# Patient Record
Sex: MALE | Race: WHITE | NOT HISPANIC OR LATINO | URBAN - METROPOLITAN AREA
[De-identification: names, ages, dates, MRNs, and addresses within clinical notes are randomized per-mention and may not be internally consistent; named-entity substitution may affect disease eponyms.]

---

## 2024-09-03 RX ORDER — SILVER SULFADIAZINE 10 MG/G
CREAM TOPICAL DAILY
COMMUNITY
Start: 2024-08-08 | End: 2025-08-08

## 2024-09-03 RX ORDER — HYDROXYCHLOROQUINE SULFATE 200 MG/1
400 TABLET, FILM COATED ORAL DAILY
COMMUNITY
Start: 2024-08-08

## 2024-09-03 RX ORDER — FOLIC ACID 0.8 MG
400 TABLET ORAL DAILY
COMMUNITY

## 2024-09-03 RX ORDER — ATORVASTATIN CALCIUM 20 MG/1
20 TABLET, FILM COATED ORAL DAILY
COMMUNITY
Start: 2023-10-25

## 2024-09-03 RX ORDER — PRIMIDONE 50 MG/1
50 TABLET ORAL 4 TIMES DAILY
COMMUNITY

## 2024-09-03 RX ORDER — LEFLUNOMIDE 10 MG/1
10 TABLET ORAL DAILY
COMMUNITY
Start: 2024-08-30

## 2024-09-03 RX ORDER — TRIAMCINOLONE ACETONIDE 1 MG/G
OINTMENT TOPICAL 2 TIMES DAILY
COMMUNITY
Start: 2024-08-29 | End: 2025-08-29

## 2024-09-20 RX ORDER — IPRATROPIUM BROMIDE AND ALBUTEROL 20; 100 UG/1; UG/1
SPRAY, METERED RESPIRATORY (INHALATION)
COMMUNITY
Start: 2024-07-25

## 2024-09-23 ENCOUNTER — OFFICE VISIT (OUTPATIENT)
Dept: PODIATRY | Age: 69
End: 2024-09-23
Payer: COMMERCIAL

## 2024-09-23 VITALS
SYSTOLIC BLOOD PRESSURE: 122 MMHG | HEART RATE: 85 BPM | DIASTOLIC BLOOD PRESSURE: 72 MMHG | OXYGEN SATURATION: 96 % | WEIGHT: 190.4 LBS | HEIGHT: 72 IN | BODY MASS INDEX: 25.79 KG/M2 | TEMPERATURE: 98.9 F

## 2024-09-23 DIAGNOSIS — E11.42 CONTROLLED TYPE 2 DIABETES MELLITUS WITH DIABETIC POLYNEUROPATHY, UNSPECIFIED WHETHER LONG TERM INSULIN USE (HCC): Primary | ICD-10-CM

## 2024-09-23 DIAGNOSIS — B35.1 ONYCHOMYCOSIS: ICD-10-CM

## 2024-09-23 PROCEDURE — 11721 DEBRIDE NAIL 6 OR MORE: CPT | Performed by: STUDENT IN AN ORGANIZED HEALTH CARE EDUCATION/TRAINING PROGRAM

## 2024-09-23 PROCEDURE — 99203 OFFICE O/P NEW LOW 30 MIN: CPT | Performed by: STUDENT IN AN ORGANIZED HEALTH CARE EDUCATION/TRAINING PROGRAM

## 2024-09-23 NOTE — PROGRESS NOTES
"Ambulatory Visit  Name: Florencio Pool      : 1955      MRN: [id number]  Encounter Provider: Jaye Saenz DPM  Encounter Date: 2024   Encounter department: [facility] [place]    Assessment & Plan  Controlled type 2 diabetes mellitus with diabetic polyneuropathy, unspecified whether long term insulin use (HCC)    No results found for: \"HGBA1C\"         Onychomycosis  Q9            PLAN:  I reviewed clinical exam with patient in detail today. I have discussed with the patient the pathophysiology of this diagnosis and reviewed how the examination correlates with this diagnosis.  DFE as below. Patient has significant lower extremity risk due to neuropathy, parasthesia, edema, and trophic skin changes to the lower extremity.  Debridement of toenails. Using nail nipper, lisa, and curette, nails were sharply debrided, reduced in thickness and length. Devitalized nail tissue and fungal debris excised and removed. Patient tolerated well.    F/u [facility] for LE wounds  Discussed proper shoe gear, daily inspections of feet, and general foot health with patient.   Patient has Q9  findings and is recommended for at risk foot care every 9-10 weeks.      History of Present Illness     Florencio Pool is a 69 y.o. male who presents for DFE and nail care. See's [facility]CC for Venous ulcerations to LE with legs wrapped. + diabetic. Notes some burning/tingling to left heel.       Review of Systems   Constitutional:  Negative for activity change, chills and fever.   HENT: Negative.     Respiratory:  Negative for cough, chest tightness and shortness of breath.    Cardiovascular:  Positive for leg swelling. Negative for chest pain.   Endocrine: Negative.    Genitourinary: Negative.    Neurological: Negative.  Negative for numbness.   Psychiatric/Behavioral: Negative.  Negative for agitation and behavioral problems.            Objective     /72   Pulse 85   Temp 98.9 °F (37.2 °C) (Temporal)   Ht 6' " (1.829 m)   Wt 86.4 kg (190 lb 6.4 oz)   SpO2 96%   BMI 25.82 kg/m²     Physical Exam  Constitutional:       General: He is not in acute distress.     Appearance: Normal appearance. He is not ill-appearing.   Cardiovascular:      Pulses: Pulses are weak.           Dorsalis pedis pulses are 1+ on the right side and 1+ on the left side.        Posterior tibial pulses are 0 on the right side and 0 on the left side.      Comments: Legs to toes warm to cool. Diminished pedal hair. B/L LE mild edema with varicosities.   Pulmonary:      Effort: Pulmonary effort is normal.   Musculoskeletal:         General: No tenderness. Normal range of motion.   Feet:      Right foot:      Skin integrity: Dry skin present. No ulcer, skin breakdown, erythema, warmth or callus.      Left foot:      Skin integrity: Dry skin present. No ulcer, skin breakdown, erythema, warmth or callus.   Skin:     General: Skin is warm.      Capillary Refill: Capillary refill takes less than 2 seconds.      Comments: Atrophic skin to LE - thin, dry and shiny.   Dressings intact to B/L LE. Underlying wounds not assessed.   Elongated, thickened, yellowed toenails x10 with subungual debris.   Neurological:      General: No focal deficit present.      Mental Status: He is alert and oriented to person, place, and time.   Psychiatric:         Mood and Affect: Mood normal.         Diabetic Foot Exam    Patient's shoes and socks removed.    Right Foot/Ankle   Right Foot Inspection  Skin Exam: skin normal, skin intact and dry skin. No warmth, no callus, no erythema, no maceration, no abnormal color, no pre-ulcer, no ulcer and no callus.     Toe Exam: right toe deformity.     Sensory   Vibration: absent  Proprioception: absent  Monofilament testing: diminished    Vascular  Capillary refills: < 3 seconds  The right DP pulse is 1+. The right PT pulse is 0.     Left Foot/Ankle  Left Foot Inspection  Skin Exam: skin normal, skin intact and dry skin. No warmth, no  erythema, no maceration, normal color, no pre-ulcer, no ulcer and no callus.     Toe Exam: left toe deformity.     Sensory   Vibration: absent  Proprioception: absent  Monofilament testing: diminished    Vascular  Capillary refills: < 3 seconds  The left DP pulse is 1+. The left PT pulse is 0.     Assign Risk Category  Deformity present  Loss of protective sensation  Weak pulses  Risk: 2

## 2025-01-26 ENCOUNTER — APPOINTMENT (OUTPATIENT)
Dept: RADIOLOGY | Facility: CLINIC | Age: 70
End: 2025-01-26
Payer: COMMERCIAL

## 2025-01-26 ENCOUNTER — OFFICE VISIT (OUTPATIENT)
Dept: URGENT CARE | Facility: CLINIC | Age: 70
End: 2025-01-26
Payer: COMMERCIAL

## 2025-01-26 VITALS
DIASTOLIC BLOOD PRESSURE: 66 MMHG | HEART RATE: 90 BPM | OXYGEN SATURATION: 98 % | HEIGHT: 72 IN | RESPIRATION RATE: 16 BRPM | TEMPERATURE: 96.6 F | WEIGHT: 185 LBS | BODY MASS INDEX: 25.06 KG/M2 | SYSTOLIC BLOOD PRESSURE: 124 MMHG

## 2025-01-26 DIAGNOSIS — S81.002A OPEN WOUND OF LEFT KNEE, INITIAL ENCOUNTER: Primary | ICD-10-CM

## 2025-01-26 PROCEDURE — 73564 X-RAY EXAM KNEE 4 OR MORE: CPT

## 2025-01-26 PROCEDURE — 99213 OFFICE O/P EST LOW 20 MIN: CPT

## 2025-01-26 RX ORDER — CEPHALEXIN 500 MG/1
500 CAPSULE ORAL EVERY 8 HOURS SCHEDULED
Qty: 21 CAPSULE | Refills: 0 | Status: SHIPPED | OUTPATIENT
Start: 2025-01-26 | End: 2025-02-02

## 2025-01-26 NOTE — PROGRESS NOTES
[facility]        NAME: Florencio Pool is a 70 y.o. male  : 1955    MRN: [id number]  DATE: 2025  TIME: 3:45 PM    Assessment and Plan   Open wound of left knee, initial encounter [S81.002A]  1. Open wound of left knee, initial encounter  XR knee 4+ vw left injury    cephalexin (KEFLEX) 500 mg capsule    Ambulatory Referral to Wound Care    CANCELED: XR knee 3 vw left non injury        Discussed problem with patient.  X-rays reveal no acute abnormalities but awaiting official radiology interpretation.  Will prescribe Keflex for antibiotic prophylaxis and discussed good wound care.  Wound was addressed using nonadherent, ABG, Coban.  Placing referral for wound care for follow-up as patient has been with Dr. Bloom in the past Tdap utd    Patient Instructions       Follow up with PCP in 3-5 days.  Proceed to  ER if symptoms worsen.    If tests are performed, our office will contact you with results only if changes need to made to the care plan discussed with you at the visit. [facility]    Chief Complaint     Chief Complaint   Patient presents with   • Knee Injury     Bumped left knee getting into car X 3 days ago , then x 1 day ago bumped area oozing drainage and blood and starting to hurt         History of Present Illness       70-year-old male with a past medical history of of stasis dermatitis, diabetes presents with a left knee wound that occurred 3 days ago.  Patient states he bumped his knee getting into his car and suffered a puncture wound to it.  Has not tried to do conservative management by applying antibiotic ointment and keeping it clean and dry but started to burn and hurt 1 day ago.      Review of Systems   Review of Systems   Constitutional:  Negative for appetite change, chills, fatigue and fever.   Respiratory:  Negative for cough, shortness of breath, wheezing and stridor.    Cardiovascular:  Negative for chest pain and  palpitations.   Skin:  Positive for wound.         Current Medications       Current Outpatient Medications:   •  atorvastatin (LIPITOR) 20 mg tablet, Take 20 mg by mouth daily, Disp: , Rfl:   •  cephalexin (KEFLEX) 500 mg capsule, Take 1 capsule (500 mg total) by mouth every 8 (eight) hours for 7 days, Disp: 21 capsule, Rfl: 0  •  Combivent Respimat inhaler, INHALE 1 PUFF BY MOUTH 4 TIMES A DAY AS NEEDED, Disp: , Rfl:   •  folic acid (FOLVITE) 800 MCG tablet, Take 400 mcg by mouth daily, Disp: , Rfl:   •  hydroxychloroquine (PLAQUENIL) 200 mg tablet, Take 400 mg by mouth daily, Disp: , Rfl:   •  leflunomide (ARAVA) 10 MG tablet, Take 10 mg by mouth daily, Disp: , Rfl:   •  primidone (MYSOLINE) 50 mg tablet, Take 50 mg by mouth 4 (four) times a day, Disp: , Rfl:   •  sitaGLIPtin (Januvia) 100 mg tablet, Take 100 mg by mouth daily, Disp: , Rfl:   •  Aspirin 81 MG CAPS, Take 81 mg by mouth (Patient not taking: Reported on 1/26/2025), Disp: , Rfl:   •  Coenzyme Q10-Fish Oil-Vit E (CO-Q 10 Omega-3 Fish Oil) CAPS, Take by mouth (Patient not taking: Reported on 1/26/2025), Disp: , Rfl:   •  Enbrel SureClick 50 MG/ML injection, , Disp: , Rfl:   •  silver sulfadiazine (SILVADENE,SSD) 1 % cream, Apply topically daily (Patient not taking: Reported on 1/26/2025), Disp: , Rfl:   •  triamcinolone (KENALOG) 0.1 % ointment, Apply topically 2 (two) times a day (Patient not taking: Reported on 1/26/2025), Disp: , Rfl:     Current Allergies     Allergies as of 01/26/2025 - Reviewed 01/26/2025   Allergen Reaction Noted   • Sulfa antibiotics Rash 08/08/2024            The following portions of the patient's history were reviewed and updated as appropriate: allergies, current medications, past family history, past medical history, past social history, past surgical history and problem list.     Past Medical History:   Diagnosis Date   • Diabetes mellitus (HCC)    • Rheumatic arteritis        Past Surgical History:   Procedure Laterality  Date   • NO PAST SURGERIES         History reviewed. No pertinent family history.      Medications have been verified.        Objective   /66   Pulse 90   Temp (!) 96.6 °F (35.9 °C)   Resp 16   Ht 6' (1.829 m)   Wt 83.9 kg (185 lb)   SpO2 98%   BMI 25.09 kg/m²        Physical Exam     Physical Exam  Vitals and nursing note reviewed.   Constitutional:       General: He is not in acute distress.     Appearance: Normal appearance. He is normal weight. He is not ill-appearing, toxic-appearing or diaphoretic.   Cardiovascular:      Rate and Rhythm: Normal rate and regular rhythm.      Pulses: Normal pulses.      Heart sounds: Normal heart sounds. No murmur heard.     No friction rub. No gallop.   Pulmonary:      Effort: Pulmonary effort is normal. No respiratory distress.      Breath sounds: Normal breath sounds. No stridor. No wheezing, rhonchi or rales.   Chest:      Chest wall: No tenderness.   Musculoskeletal:      Comments: Puncture wound abrasion to patient's left knee.  Minor surrounding erythema with serosanguineous discharge.   Neurological:      Mental Status: He is alert.

## 2025-01-27 ENCOUNTER — TELEPHONE (OUTPATIENT)
Age: 70
End: 2025-01-27

## 2025-01-29 ENCOUNTER — OFFICE VISIT (OUTPATIENT)
Facility: CLINIC | Age: 70
End: 2025-01-29
Payer: COMMERCIAL

## 2025-01-29 VITALS
RESPIRATION RATE: 18 BRPM | TEMPERATURE: 98 F | SYSTOLIC BLOOD PRESSURE: 118 MMHG | HEART RATE: 77 BPM | BODY MASS INDEX: 25.06 KG/M2 | DIASTOLIC BLOOD PRESSURE: 66 MMHG | HEIGHT: 72 IN | WEIGHT: 185 LBS

## 2025-01-29 DIAGNOSIS — Z72.0 TOBACCO USE: ICD-10-CM

## 2025-01-29 DIAGNOSIS — S81.002A OPEN WOUND OF LEFT KNEE, INITIAL ENCOUNTER: Primary | ICD-10-CM

## 2025-01-29 DIAGNOSIS — E11.69 TYPE 2 DIABETES MELLITUS WITH OTHER SPECIFIED COMPLICATION, UNSPECIFIED WHETHER LONG TERM INSULIN USE (HCC): ICD-10-CM

## 2025-01-29 PROCEDURE — 99204 OFFICE O/P NEW MOD 45 MIN: CPT | Performed by: FAMILY MEDICINE

## 2025-01-29 PROCEDURE — 99213 OFFICE O/P EST LOW 20 MIN: CPT | Performed by: FAMILY MEDICINE

## 2025-01-29 RX ORDER — LIDOCAINE 40 MG/G
CREAM TOPICAL ONCE
Status: COMPLETED | OUTPATIENT
Start: 2025-01-29 | End: 2025-01-29

## 2025-01-29 RX ADMIN — LIDOCAINE 1 APPLICATION: 40 CREAM TOPICAL at 15:22

## 2025-01-29 NOTE — PROGRESS NOTES
Wound Procedure Treatment Traumatic Anterior;Left Knee    Performed by: Laura Verdugo RN  Authorized by: Kelli Diane MD    Associated wounds:   Wound 01/29/25 Traumatic Knee Anterior;Left  Wound cleansed with:  Wound cleanser  Applied primary dressing:  Acticoat and Packing  Applied secondary dressing:  ABD  Dressing secured with:  Ada and Tape  Comments:    Acticoat 7 packed to wound base     ACE bandage

## 2025-01-29 NOTE — PROGRESS NOTES
Patient ID: Florencio Pool is a 70 y.o. male Date of Birth 1955       Chief Complaint   Patient presents with   • New Patient Visit       Allergies:  Sulfa antibiotics    Diagnosis:      Diagnosis ICD-10-CM Associated Orders   1. Open wound of left knee, initial encounter  S81.002A Wound cleansing and dressings Traumatic Anterior;Left Knee     lidocaine (LMX) 4 % cream     EXTERNAL Referral to Home Health     Wound Procedure Treatment Traumatic Anterior;Left Knee      2. Tobacco use  Z72.0       3. Type 2 diabetes mellitus with other specified complication, unspecified whether long term insulin use (HCC)  E11.69                 Assessment & Plan:  Open traumatic wound left knee: Small wound anterior knee with granular appearing wound bed.  However on exam there is deep probe about 0.7 cm and is very close to bone though no bone is clearly exposed.  Serosanguineous drainage is present.  Periwound is pink without increased warmth, lymphangitic streaking.  No purulence or malodor.  Do not appreciate any surrounding fluid collection or abscess.  Cleanse with prophase  Recommend thin strip of Acticoat 3 to pack wound - gently placing, do not force.  Discussed that should this area fill-in and packing be unable to be placed in between this visit and next week, can apply on top of wound.  Change dressing 3 times per week.  Patient's recent urgent care visit and x-ray reviewed.  X-ray detailed below.  Recommend patient complete antibiotic as prescribed by urgent care  Avoid any excessive ambulation or bending of the knee.  Recommend elevation of legs when resting above level of heart  T2DM:  A1C results reviewed with the patient today. though no recent A1c is available in our system patient believes it is around 8.  Discussed importance of diabetic control especially in setting of open wounds.  He expressed understanding and is monitoring this with his PCP  Smoking: Patient counseled on smoking cessation.  He reports he  "is not ready to quit at this time.   Reviewed extreme importance of very close monitoring of wound. Discussed that diabetes places patients at increased risk for wound complications despite adequate cleansing and care.  Should patient experience any acute change or any of the following symptoms including but not limited to fever, chills, increased pain at the wound, swelling, purulent drainage, foul odor, etc... to immediately proceed to emergency department. Pt expresses understanding.   Follow-up in 1 week or sooner if needed    X-ray left knee 1/26/2025:  \"IMPRESSION:  1. No acute osseous abnormality.  2. Moderate prepatellar soft tissue swelling without radiopaque foreign body or soft tissue gas.\"    Portions of the record may have been created with voice recognition software. Occasional wrong word or \"sound alike\" substitutions may have occurred due to the inherent limitations of voice recognition software. Read the chart carefully and recognize, using context, where substitutions have occurred.    Subjective:   Mr. Pool is a 70-year-old male with a past medical history including but not limited to stasis dermatitis, rheumatoid arthritis, tobacco use, and diabetes who presents to the wound center today for evaluation of left knee wound that occurred around 1/23/2025 when he bumped his leg on a part in his car.  Denies any foreign body within wound.  Per patient and chart review went to urgent care on 1/26/2025 because he was concerned for infection.  At urgent care, x-ray completed which did not show any acute abnormalities.  He was prescribed Keflex and instructed to utilize nonadherent gauze ABD and Coban and.  Referred to wound care by urgent care.  Patient has followed with a wound care center in the past for stasis dermatitis and venous ulcerations.   He reports he has not had fever or chills.  See below for full ROS      The following portions of the patient's history were reviewed and updated as " appropriate:   Patient Active Problem List   Diagnosis   • Type 2 diabetes mellitus with other specified complication (HCC)     Past Medical History:   Diagnosis Date   • Diabetes mellitus (HCC)    • Rheumatic arteritis      Past Surgical History:   Procedure Laterality Date   • NO PAST SURGERIES       No family history on file.   Social History     Socioeconomic History   • Marital status: Single     Spouse name: Not on file   • Number of children: Not on file   • Years of education: Not on file   • Highest education level: Not on file   Occupational History   • Not on file   Tobacco Use   • Smoking status: Some Days     Current packs/day: 0.50     Types: Cigarettes   • Smokeless tobacco: Never   Vaping Use   • Vaping status: Never Used   Substance and Sexual Activity   • Alcohol use: Not Currently   • Drug use: Not Currently   • Sexual activity: Not on file   Other Topics Concern   • Not on file   Social History Narrative   • Not on file     Social Drivers of Health     Financial Resource Strain: Medium Risk (6/20/2022)    Received from [facility]    Overall Financial Resource Strain (CARDIA)    • Difficulty of Paying Living Expenses: Somewhat hard   Food Insecurity: No Food Insecurity (6/20/2022)    Received from [facility]    Hunger Vital Sign    • Worried About Running Out of Food in the Last Year: Never true    • Ran Out of Food in the Last Year: Never true   Transportation Needs: No Transportation Needs (6/20/2022)    Received from [facility]    PRAPARE - Transportation    • Lack of Transportation (Medical): No    • Lack of Transportation (Non-Medical): No   Physical Activity: Insufficiently Active (6/20/2022)    Received from [facility]    Exercise Vital Sign    • Days of Exercise per Week: 3 days    • Minutes of Exercise per Session: 30 min   Stress: Stress Concern Present (6/20/2022)    Received from [facility]    [employer]  [employer] - Occupational Stress Questionnaire    • Feeling of Stress : Very much   Social Connections: Moderately Isolated (6/20/2022)    Received from [facility]    Social Connection and Isolation Panel [NHANES]    • Frequency of Communication with Friends and Family: More than three times a week    • Frequency of Social Gatherings with Friends and Family: Once a week    • Attends Church Services: 1 to 4 times per year    • Active Member of Clubs or Organizations: No    • Attends Club or Organization Meetings: Never    • Marital Status:    Intimate Partner Violence: Not At Risk (6/20/2022)    Received from [facility]    Intimate Partner Violence    • Within the last year, have you been afraid of your partner, ex-partner or family member?: 2    • Within the last year, have you been humiliated or emotionally abused in other ways by your partner, ex-partner or family member?: 2    • Within the last year, have you been kicked, hit, slapped, or otherwise physically hurt by your partner, ex-partner or family member?: 2    • Within the last year, have you been raped or forced to have any kind of sexual activity by your partner, ex-partner or family member?: 2   Housing Stability: Not on file        Current Outpatient Medications:   •  Aspirin 81 MG CAPS, Take 81 mg by mouth (Patient not taking: Reported on 1/26/2025), Disp: , Rfl:   •  atorvastatin (LIPITOR) 20 mg tablet, Take 20 mg by mouth daily, Disp: , Rfl:   •  cephalexin (KEFLEX) 500 mg capsule, Take 1 capsule (500 mg total) by mouth every 8 (eight) hours for 7 days, Disp: 21 capsule, Rfl: 0  •  Coenzyme Q10-Fish Oil-Vit E (CO-Q 10 Omega-3 Fish Oil) CAPS, Take by mouth (Patient not taking: Reported on 1/26/2025), Disp: , Rfl:   •  Combivent Respimat inhaler, INHALE 1 PUFF BY MOUTH 4 TIMES A DAY AS NEEDED, Disp: , Rfl:   •  Enbrel SureClick 50 MG/ML injection, , Disp: , Rfl:   •  folic acid (FOLVITE) 800 MCG tablet,  Take 400 mcg by mouth daily, Disp: , Rfl:   •  hydroxychloroquine (PLAQUENIL) 200 mg tablet, Take 400 mg by mouth daily, Disp: , Rfl:   •  leflunomide (ARAVA) 10 MG tablet, Take 10 mg by mouth daily, Disp: , Rfl:   •  primidone (MYSOLINE) 50 mg tablet, Take 50 mg by mouth 4 (four) times a day, Disp: , Rfl:   •  silver sulfadiazine (SILVADENE,SSD) 1 % cream, Apply topically daily (Patient not taking: Reported on 1/26/2025), Disp: , Rfl:   •  sitaGLIPtin (Januvia) 100 mg tablet, Take 100 mg by mouth daily, Disp: , Rfl:   •  triamcinolone (KENALOG) 0.1 % ointment, Apply topically 2 (two) times a day (Patient not taking: Reported on 1/26/2025), Disp: , Rfl:   No current facility-administered medications for this visit.    Review of Systems   Constitutional:  Negative for chills and fever.   Respiratory:  Negative for shortness of breath.    Cardiovascular:  Negative for chest pain, palpitations and leg swelling.   Musculoskeletal:  Negative for gait problem and joint swelling (Denies swelling).   Skin:  Positive for wound (Left knee).   Psychiatric/Behavioral:  The patient is not nervous/anxious.        Objective:  /66   Pulse 77   Temp 98 °F (36.7 °C)   Resp 18   Ht 6' (1.829 m)   Wt 83.9 kg (185 lb)   BMI 25.09 kg/m²   Pain Score:   4     Physical Exam  Vitals and nursing note reviewed.   Constitutional:       General: He is not in acute distress.     Appearance: He is not ill-appearing, toxic-appearing or diaphoretic.   Eyes:      General: No scleral icterus.  Cardiovascular:      Rate and Rhythm: Normal rate.      Comments: Diminished but palpable DP/PT LLE  Pulmonary:      Effort: Pulmonary effort is normal. No respiratory distress.   Musculoskeletal:      Left lower leg: No edema.   Skin:     General: Skin is warm.      Findings: Wound present.             Comments: 1- Small open wound anterior knee with granular appearing wound bed.  There is deep probe about 0.7 cm and is very close to bone though no  "bone is clearly exposed.  Serosanguineous drainage is present.  Periwound is pink without increased warmth, lymphangitic streaking.  No purulence or malodor.  Do not appreciate any surrounding fluid collection or abscess.    Range of motion at knee intact    Bilateral lower extremities with significant evidence of chronic venous skin changes with hyperpigmentation, dry flaking skin, hemosiderin staining   Neurological:      Mental Status: He is alert and oriented to person, place, and time.   Psychiatric:         Mood and Affect: Mood normal.         Behavior: Behavior normal.             Wound 01/29/25 Knee Anterior;Left (Active)   Wound Image   01/29/25 1502   Wound Description Pink;Yellow 01/29/25 1504   Tammie-wound Assessment Dry;Intact;Scaly 01/29/25 1504   Wound Length (cm) 0.5 cm 01/29/25 1504   Wound Width (cm) 0.7 cm 01/29/25 1504   Wound Depth (cm) 0.2 cm 01/29/25 1504   Wound Surface Area (cm^2) 0.35 cm^2 01/29/25 1504   Wound Volume (cm^3) 0.07 cm^3 01/29/25 1504   Calculated Wound Volume (cm^3) 0.07 cm^3 01/29/25 1504   Drainage Amount Moderate 01/29/25 1504   Drainage Description Serosanguineous;Yellow;Tan 01/29/25 1504   Non-staged Wound Description Full thickness 01/29/25 1504             No results found for: \"HGBA1C\"    Wound Instructions:  Orders Placed This Encounter   Procedures   • Wound cleansing and dressings Traumatic Anterior;Left Knee     Wash your hands with soap and water.  Remove old dressing, discard into plastic bag and place in trash.  Cleanse the wound with saline prior to applying a clean dressing. Do not use tissue or cotton balls. Do not scrub the wound. Pat dry using gauze.    Shower no     Cleanse wound with prophase prior to each dressing change  Gently pack left knee wound with acticoat 3. Do not force packing into wound base. If unable to pack, place acticoat 3 on top of wound.  Cover with ABD  Secure with roll gauze and ace bandage   Change dressing 3x week at wound center "     Try to avoid moving knee as much as you are able. Keep left leg elevated as much as possible.     We will attempt to get you home health nurses      Return to wound center in 1 week     Standing Status:   Future     Expiration Date:   2/5/2025   • Wound Procedure Treatment Traumatic Anterior;Left Knee     This order was created via procedure documentation   • EXTERNAL Referral to Home Health     Standing Status:   Future     Expected Date:   1/31/2025     Expiration Date:   1/29/2026     Referral Priority:   Routine     Referral Type:   Home Health     Referral Reason:   Specialty Services Required     Referred to Provider:   [facility] Remington Arora     Requested Specialty:   Home Health Services     Number of Visits Requested:   1     Expiration Date:   1/29/2026       Kelli Diane MD

## 2025-01-29 NOTE — PATIENT INSTRUCTIONS
Orders Placed This Encounter   Procedures    Wound cleansing and dressings Traumatic Anterior;Left Knee     Wash your hands with soap and water.  Remove old dressing, discard into plastic bag and place in trash.  Cleanse the wound with saline prior to applying a clean dressing. Do not use tissue or cotton balls. Do not scrub the wound. Pat dry using gauze.    Shower no     Cleanse wound with prophase prior to each dressing change  Gently pack left knee wound with acticoat 3. Do not force packing into wound base. If unable to pack, place acticoat 3 on top of wound.  Cover with ABD  Secure with roll gauze and ace bandage   Change dressing 3x week at wound center     Try to avoid moving knee as much as you are able. Keep left leg elevated as much as possible.     We will attempt to get you home health nurses      Return to wound center in 1 week     Standing Status:   Future     Expiration Date:   2/5/2025    EXTERNAL Referral to Home Health     Standing Status:   Future     Expected Date:   1/31/2025     Expiration Date:   1/29/2026     Referral Priority:   Routine     Referral Type:   Home Health     Referral Reason:   Specialty Services Required     Referred to Provider:   [facility] Remington Arora     Requested Specialty:   Home Health Services     Number of Visits Requested:   1     Expiration Date:   1/29/2026

## 2025-02-04 ENCOUNTER — OFFICE VISIT (OUTPATIENT)
Dept: PODIATRY | Age: 70
End: 2025-02-04
Payer: COMMERCIAL

## 2025-02-04 VITALS — BODY MASS INDEX: 24.35 KG/M2 | HEIGHT: 72 IN | WEIGHT: 179.8 LBS

## 2025-02-04 DIAGNOSIS — L85.3 XEROSIS CUTIS: ICD-10-CM

## 2025-02-04 DIAGNOSIS — E11.42 CONTROLLED TYPE 2 DIABETES MELLITUS WITH DIABETIC POLYNEUROPATHY, UNSPECIFIED WHETHER LONG TERM INSULIN USE (HCC): Primary | ICD-10-CM

## 2025-02-04 DIAGNOSIS — B35.1 ONYCHOMYCOSIS: ICD-10-CM

## 2025-02-04 PROCEDURE — 11721 DEBRIDE NAIL 6 OR MORE: CPT | Performed by: STUDENT IN AN ORGANIZED HEALTH CARE EDUCATION/TRAINING PROGRAM

## 2025-02-04 PROCEDURE — 99212 OFFICE O/P EST SF 10 MIN: CPT | Performed by: STUDENT IN AN ORGANIZED HEALTH CARE EDUCATION/TRAINING PROGRAM

## 2025-02-04 RX ORDER — AMMONIUM LACTATE 12 G/100G
CREAM TOPICAL 2 TIMES DAILY
Qty: 385 G | Refills: 3 | Status: SHIPPED | OUTPATIENT
Start: 2025-02-04

## 2025-02-04 RX ORDER — LEFLUNOMIDE 20 MG/1
1 TABLET ORAL DAILY
COMMUNITY
Start: 2024-12-23

## 2025-02-04 NOTE — PROGRESS NOTES
Florencio Pool  1955  AT RISK FOOT CARE    1. Controlled type 2 diabetes mellitus with diabetic polyneuropathy, unspecified whether long term insulin use (HCC)        2. Onychomycosis        3. Xerosis cutis  ammonium lactate (LAC-HYDRIN) 12 % cream          Patient presents for at-risk foot care.  Patient has no acute concerns today other than dry skin.  Patient has significant lower extremity risk due to neuropathy, parasthesia, edema, and trophic skin changes to the lower extremity.    On exam patient has thickened, hypertrophic, discolored, brittle toenails with subungual debris and tenderness x10  Patient has lower extremity edema  PAtients skin is atrophic, thickened nails, and decreased pedal hair  Patient has decreased pinprick and vibratory sensation to his feet and parasthesia    Today's treatment includes:  Debridement of toenails. Using nail nipper, lisa, and curette, nails were sharply debrided, reduced in thickness and length. Devitalized nail tissue and fungal debris excised and removed. Patient tolerated well.      Amlactin rx'd for BID use.     Discussed proper shoe gear, daily inspections of feet, and general foot health with patient. Patient has Q9  findings and is recommended for at risk foot care every 9-10 weeks.    Patients most recent complete clinical foot exam was on: 9/23/24

## 2025-02-04 NOTE — PROGRESS NOTES
"Patient ID: Florencio Pool is a 70 y.o. male Date of Birth 1955       Chief Complaint   Patient presents with   • Follow Up Wound Care Visit       Allergies:  Sulfa antibiotics    Diagnosis:      Diagnosis ICD-10-CM Associated Orders   1. Open wound of left knee, initial encounter  S81.002A Wound cleansing and dressings Traumatic Anterior;Left Knee     lidocaine (LMX) 4 % cream              Assessment & Plan:  Open traumatic wound left knee: Improved, measuring smaller.  Periwound appears pink (clinical media appears erythematous however this is not present on bedside exam, pink periwound consistent with the fragile new skin noted beneath the dried flaking skin in the resolving edema.  Depth also improved around 0.4 today.  No longer with undermining.  No surrounding induration, fluctuance, crepitus.  Continue cleansing with prophase  Apply PolyMem max ag to open area  To the immediate periwound apply Vaseline, to the large areas of thick flaking dry skin apply Lac-Hydrin  Recommend compression with Ace bandage  X-ray completed 1/26/2025 again reviewed and detailed below  Avoid any excessive ambulation or bending of the knee.  Recommend elevation of legs when resting above level of heart  Smoking: Patient counseled on smoking cessation.  He again reports he is not ready to quit at this time.   Reviewed extreme importance of very close monitoring of wound. Discussed that diabetes places patients at increased risk for wound complications despite adequate cleansing and care.  Should patient experience any acute change or any of the following symptoms including but not limited to fever, chills, increased pain at the wound, swelling, purulent drainage, foul odor, etc... to immediately proceed to emergency department. Pt expresses understanding.   Follow-up in 1 week or sooner if needed     X-ray left knee 1/26/2025:  \"IMPRESSION:  1. No acute osseous abnormality.  2. Moderate prepatellar soft tissue swelling without " "radiopaque foreign body or soft tissue gas.\"    Portions of the record may have been created with voice recognition software. Occasional wrong word or \"sound alike\" substitutions may have occurred due to the inherent limitations of voice recognition software. Read the chart carefully and recognize, using context, where substitutions have occurred.    Subjective:   1/29/2025: Mr. Pool is a 70-year-old male with a past medical history including but not limited to stasis dermatitis, rheumatoid arthritis, tobacco use, and diabetes who presents to the wound center today for evaluation of left knee wound that occurred around 1/23/2025 when he bumped his leg on a part in his car.  Denies any foreign body within wound.  Per patient and chart review went to urgent care on 1/26/2025 because he was concerned for infection.  At urgent care, x-ray completed which did not show any acute abnormalities.  He was prescribed Keflex and instructed to utilize nonadherent gauze ABD and Coban and.  Referred to wound care by urgent care.  Patient has followed with a wound care center in the past for stasis dermatitis and venous ulcerations.   He reports he has not had fever or chills.  See below for full ROS    2/5/2025: Mr. Pool presents today for follow-up.  He reports that he has been doing well since he was last seen but did not get home nurses.  He reports that he does not have pain in his knee and overall feels that it feels better when compared to last week's visit.  He denies fever, chills.  Continues to smoke.  Denies other new acute complaints.      The following portions of the patient's history were reviewed and updated as appropriate:   Patient Active Problem List   Diagnosis   • Type 2 diabetes mellitus with other specified complication (HCC)     Past Medical History:   Diagnosis Date   • Diabetes mellitus (HCC)    • Rheumatic arteritis      Past Surgical History:   Procedure Laterality Date   • NO PAST SURGERIES       No " family history on file.   Social History     Socioeconomic History   • Marital status: Single     Spouse name: None   • Number of children: None   • Years of education: None   • Highest education level: None   Occupational History   • None   Tobacco Use   • Smoking status: Some Days     Current packs/day: 0.50     Types: Cigarettes   • Smokeless tobacco: Never   Vaping Use   • Vaping status: Never Used   Substance and Sexual Activity   • Alcohol use: Not Currently   • Drug use: Not Currently   • Sexual activity: None   Other Topics Concern   • None   Social History Narrative   • None     Social Drivers of Health     Financial Resource Strain: Medium Risk (6/20/2022)    Received from [facility]    Overall Financial Resource Strain (CARDIA)    • Difficulty of Paying Living Expenses: Somewhat hard   Food Insecurity: No Food Insecurity (6/20/2022)    Received from [facility]    Hunger Vital Sign    • Worried About Running Out of Food in the Last Year: Never true    • Ran Out of Food in the Last Year: Never true   Transportation Needs: No Transportation Needs (6/20/2022)    Received from [facility]    PRAPARE - Transportation    • Lack of Transportation (Medical): No    • Lack of Transportation (Non-Medical): No   Physical Activity: Insufficiently Active (6/20/2022)    Received from [facility]    Exercise Vital Sign    • Days of Exercise per Week: 3 days    • Minutes of Exercise per Session: 30 min   Stress: Stress Concern Present (6/20/2022)    Received from [facility]    [employer] - Occupational Stress Questionnaire    • Feeling of Stress : Very much   Social Connections: Moderately Isolated (6/20/2022)    Received from [facility]    Social Connection and Isolation Panel [NHANES]    • Frequency of Communication with Friends and Family: More than three times a week    • Frequency of Social Gatherings with  Friends and Family: Once a week    • Attends Confucianist Services: 1 to 4 times per year    • Active Member of Clubs or Organizations: No    • Attends Club or Organization Meetings: Never    • Marital Status:    Intimate Partner Violence: Not At Risk (6/20/2022)    Received from [facility]    Intimate Partner Violence    • Within the last year, have you been afraid of your partner, ex-partner or family member?: 2    • Within the last year, have you been humiliated or emotionally abused in other ways by your partner, ex-partner or family member?: 2    • Within the last year, have you been kicked, hit, slapped, or otherwise physically hurt by your partner, ex-partner or family member?: 2    • Within the last year, have you been raped or forced to have any kind of sexual activity by your partner, ex-partner or family member?: 2   Housing Stability: Not on file        Current Outpatient Medications:   •  ammonium lactate (LAC-HYDRIN) 12 % cream, Apply topically 2 (two) times a day To feet, Disp: 385 g, Rfl: 3  •  Aspirin 81 MG CAPS, Take 81 mg by mouth (Patient not taking: Reported on 1/26/2025), Disp: , Rfl:   •  atorvastatin (LIPITOR) 20 mg tablet, Take 20 mg by mouth daily, Disp: , Rfl:   •  Coenzyme Q10-Fish Oil-Vit E (CO-Q 10 Omega-3 Fish Oil) CAPS, Take by mouth (Patient not taking: Reported on 1/26/2025), Disp: , Rfl:   •  Combivent Respimat inhaler, INHALE 1 PUFF BY MOUTH 4 TIMES A DAY AS NEEDED, Disp: , Rfl:   •  Enbrel SureClick 50 MG/ML injection, , Disp: , Rfl:   •  folic acid (FOLVITE) 800 MCG tablet, Take 400 mcg by mouth daily, Disp: , Rfl:   •  hydroxychloroquine (PLAQUENIL) 200 mg tablet, Take 400 mg by mouth daily, Disp: , Rfl:   •  leflunomide (ARAVA) 20 MG tablet, Take 1 tablet by mouth in the morning, Disp: , Rfl:   •  primidone (MYSOLINE) 50 mg tablet, Take 50 mg by mouth 4 (four) times a day, Disp: , Rfl:   •  silver sulfadiazine (SILVADENE,SSD) 1 % cream, Apply topically daily  (Patient not taking: Reported on 1/26/2025), Disp: , Rfl:   •  sitaGLIPtin (Januvia) 100 mg tablet, Take 100 mg by mouth daily, Disp: , Rfl:   •  triamcinolone (KENALOG) 0.1 % ointment, Apply topically 2 (two) times a day (Patient not taking: Reported on 1/26/2025), Disp: , Rfl:   No current facility-administered medications for this visit.    Review of Systems   Constitutional:  Negative for chills and fever.   Respiratory:  Negative for shortness of breath.    Cardiovascular:  Negative for leg swelling.   Musculoskeletal:  Negative for gait problem.   Skin:  Positive for wound (Left knee).   Psychiatric/Behavioral:  The patient is not nervous/anxious.        Objective:  /63   Pulse 73   Temp 97.8 °F (36.6 °C)   Resp 18         Physical Exam  Vitals and nursing note reviewed.   Constitutional:       General: He is not in acute distress.     Appearance: He is not ill-appearing, toxic-appearing or diaphoretic.   Eyes:      General: No scleral icterus.  Cardiovascular:      Rate and Rhythm: Normal rate.      Comments: Diminished but palpable DP/PT LLE  Pulmonary:      Effort: Pulmonary effort is normal. No respiratory distress.   Musculoskeletal:      Left lower leg: No edema.   Skin:     General: Skin is warm.      Findings: Wound present.             Comments: 1: Improved, measuring smaller.  Periwound appears pink (clinical media appears erythematous however this is not present on bedside exam, pink periwound consistent with the fragile new skin noted beneath the dried flaking skin in the resolving edema.  Depth also improved around 0.4 today.  No longer with undermining.  No surrounding induration, fluctuance, crepitus.    Range of motion at knee intact    Bilateral lower extremities with significant evidence of chronic venous skin changes with hyperpigmentation, dry flaking skin, hemosiderin staining   Neurological:      Mental Status: He is alert and oriented to person, place, and time.   Psychiatric:          Mood and Affect: Mood normal.         Behavior: Behavior normal.           Wound 01/29/25 Traumatic Knee Anterior;Left (Active)   Wound Image   02/05/25 1408   Wound Description Pink;Granulation tissue 02/05/25 1409   Tammie-wound Assessment Dry;Intact;Scaly;Scar Tissue 02/05/25 1409   Wound Length (cm) 0.4 cm 02/05/25 1409   Wound Width (cm) 0.5 cm 02/05/25 1409   Wound Depth (cm) 0.4 cm 02/05/25 1409   Wound Surface Area (cm^2) 0.2 cm^2 02/05/25 1409   Wound Volume (cm^3) 0.08 cm^3 02/05/25 1409   Calculated Wound Volume (cm^3) 0.08 cm^3 02/05/25 1409   Change in Wound Size % 61.9 02/05/25 1409   Number of underminings     Undermining 1     Undermining 1 is depth extending from     Drainage Amount Small 02/05/25 1409   Drainage Description Serosanguineous 02/05/25 1409   Non-staged Wound Description Full thickness 02/05/25 1409           Wound Instructions:  Orders Placed This Encounter   Procedures   • Wound cleansing and dressings Traumatic Anterior;Left Knee     Wash your hands with soap and water.  Remove old dressing, discard into plastic bag and place in trash.  Cleanse the wound with saline prior to applying a clean dressing. Do not use tissue or cotton balls. Do not scrub the wound. Pat dry using gauze.     Shower no      Cleanse wound with prophase prior to each dressing change  Apply vaseline to the knee/periwound  Apply amlactin lotion to the leg  Apply polymem AG  Cover with dry gauze or ABD if needed  Secure with roll gauze and ace bandage   Change dressing 3x week at wound center      Try to avoid moving knee as much as you are able. Keep left leg elevated as much as possible.      We will attempt to get you home health nurses    Start VNA 3x/week     Return to wound center in 1 week     Standing Status:   Future     Expected Date:   2/5/2025     Expiration Date:   2/12/2025     Kelli Diane MD

## 2025-02-05 ENCOUNTER — OFFICE VISIT (OUTPATIENT)
Facility: CLINIC | Age: 70
End: 2025-02-05
Payer: COMMERCIAL

## 2025-02-05 VITALS
SYSTOLIC BLOOD PRESSURE: 137 MMHG | RESPIRATION RATE: 18 BRPM | HEART RATE: 73 BPM | DIASTOLIC BLOOD PRESSURE: 63 MMHG | TEMPERATURE: 97.8 F

## 2025-02-05 DIAGNOSIS — S81.002A OPEN WOUND OF LEFT KNEE, INITIAL ENCOUNTER: Primary | ICD-10-CM

## 2025-02-05 PROCEDURE — 99213 OFFICE O/P EST LOW 20 MIN: CPT | Performed by: FAMILY MEDICINE

## 2025-02-05 RX ORDER — LIDOCAINE 40 MG/G
CREAM TOPICAL ONCE
Status: COMPLETED | OUTPATIENT
Start: 2025-02-05 | End: 2025-02-05

## 2025-02-05 RX ADMIN — LIDOCAINE 1 APPLICATION: 40 CREAM TOPICAL at 14:13

## 2025-02-05 NOTE — PATIENT INSTRUCTIONS
Orders Placed This Encounter   Procedures    Wound cleansing and dressings Traumatic Anterior;Left Knee     Wash your hands with soap and water.  Remove old dressing, discard into plastic bag and place in trash.  Cleanse the wound with saline prior to applying a clean dressing. Do not use tissue or cotton balls. Do not scrub the wound. Pat dry using gauze.     Shower no      Cleanse wound with prophase prior to each dressing change  Apply vaseline to the knee/periwound  Apply amlactin lotion to the leg  Apply polymem AG  Cover with dry gauze or ABD if needed  Secure with roll gauze and ace bandage   Change dressing 3x week at wound center      Try to avoid moving knee as much as you are able. Keep left leg elevated as much as possible.      We will attempt to get you home health nurses    Start VNA 3x/week     Return to wound center in 1 week     Standing Status:   Future     Expected Date:   2/5/2025     Expiration Date:   2/12/2025

## 2025-02-11 NOTE — PROGRESS NOTES
"Patient ID: Florencio Pool is a 70 y.o. male Date of Birth 1955       Chief Complaint   Patient presents with   • Follow Up Wound Care Visit       Allergies:  Sulfa antibiotics    Diagnosis:      Diagnosis ICD-10-CM Associated Orders   1. Open wound of left knee, initial encounter  S81.002A lidocaine (LMX) 4 % cream     Wound Procedure Treatment Traumatic Anterior;Left Knee     Debridement     Wound cleansing and dressings Traumatic Anterior;Left Knee      2. Tobacco use  Z72.0               Assessment & Plan:  Open traumatic wound left knee: Measurements improved.  Small unstable eschar removed during debridement revealing slough and fibrinous debris beneath.  Surgical debridement completed.  Periwound dry and flaking.  No edema erythema is present. No surrounding induration, fluctuance, crepitus.  Surgical debridement   Change treatment to Acticoat 7 followed by gauze. Should patient be unable to get visiting nurses he may keep this dressing in place for 1 week until his follow-up visit.  To the immediate periwound apply Vaseline, to the large areas of thick flaking dry skin apply Lac-Hydrin  Continue to recommend compression with Ace bandage  X-ray completed 1/26/2025 again reviewed and detailed below  Avoid any excessive ambulation or bending of the knee.  Recommend elevation of legs when resting above level of heart  Tobacco use: Patient counseled on smoking cessation.  He again reports he is not ready to quit at this time.   Reviewed importance of close monitoring of wound. Should patient experience any acute change or any of the following symptoms including but not limited to fever, chills, increased pain at the wound, swelling, purulent drainage, foul odor, etc... to immediately proceed to emergency department. Pt expresses understanding.   See below wound care orders for additional details   Follow-up in 1 week or sooner if needed     X-ray left knee 1/26/2025:  \"IMPRESSION:  1. No acute osseous " "abnormality.  2. Moderate prepatellar soft tissue swelling without radiopaque foreign body or soft tissue gas.\"    Portions of the record may have been created with voice recognition software. Occasional wrong word or \"sound alike\" substitutions may have occurred due to the inherent limitations of voice recognition software. Read the chart carefully and recognize, using context, where substitutions have occurred.    Subjective:   1/29/2025: Mr. Pool is a 70-year-old male with a past medical history including but not limited to stasis dermatitis, rheumatoid arthritis, tobacco use, and diabetes who presents to the wound center today for evaluation of left knee wound that occurred around 1/23/2025 when he bumped his leg on a part in his car.  Denies any foreign body within wound.  Per patient and chart review went to urgent care on 1/26/2025 because he was concerned for infection.  At urgent care, x-ray completed which did not show any acute abnormalities.  He was prescribed Keflex and instructed to utilize nonadherent gauze ABD and Coban and.  Referred to wound care by urgent care.  Patient has followed with a wound care center in the past for stasis dermatitis and venous ulcerations.   He reports he has not had fever or chills.  See below for full ROS    2/5/2025: Mr. Pool presents today for follow-up.  He reports that he has been doing well since he was last seen but did not get home nurses.  He reports that he does not have pain in his knee and overall feels that it feels better when compared to last week's visit.  He denies fever, chills.  Continues to smoke.  Denies other new acute complaints.    2/12/2025: Florencio presents today for follow-up of knee wound.  He reports since he was last seen he has no new acute complaints.  He denies fever, chills.  Continues to smoke, is not interested in quitting.      The following portions of the patient's history were reviewed and updated as appropriate:   Patient Active " Problem List   Diagnosis   • Type 2 diabetes mellitus with other specified complication (HCC)     Past Medical History:   Diagnosis Date   • Diabetes mellitus (HCC)    • Rheumatic arteritis      Past Surgical History:   Procedure Laterality Date   • NO PAST SURGERIES       No family history on file.   Social History     Socioeconomic History   • Marital status: Single     Spouse name: Not on file   • Number of children: Not on file   • Years of education: Not on file   • Highest education level: Not on file   Occupational History   • Not on file   Tobacco Use   • Smoking status: Some Days     Current packs/day: 0.50     Types: Cigarettes   • Smokeless tobacco: Never   Vaping Use   • Vaping status: Never Used   Substance and Sexual Activity   • Alcohol use: Not Currently   • Drug use: Not Currently   • Sexual activity: Not on file   Other Topics Concern   • Not on file   Social History Narrative   • Not on file     Social Drivers of Health     Financial Resource Strain: Medium Risk (6/20/2022)    Received from [facility]    Overall Financial Resource Strain (CARDIA)    • Difficulty of Paying Living Expenses: Somewhat hard   Food Insecurity: No Food Insecurity (6/20/2022)    Received from [facility]    Hunger Vital Sign    • Worried About Running Out of Food in the Last Year: Never true    • Ran Out of Food in the Last Year: Never true   Transportation Needs: No Transportation Needs (6/20/2022)    Received from [facility]    PRAPARE - Transportation    • Lack of Transportation (Medical): No    • Lack of Transportation (Non-Medical): No   Physical Activity: Insufficiently Active (6/20/2022)    Received from [facility]    Exercise Vital Sign    • Days of Exercise per Week: 3 days    • Minutes of Exercise per Session: 30 min   Stress: Stress Concern Present (6/20/2022)    Received from [facility]    [employer] -  Occupational Stress Questionnaire    • Feeling of Stress : Very much   Social Connections: Moderately Isolated (6/20/2022)    Received from [facility]    Social Connection and Isolation Panel [NHANES]    • Frequency of Communication with Friends and Family: More than three times a week    • Frequency of Social Gatherings with Friends and Family: Once a week    • Attends Zoroastrianism Services: 1 to 4 times per year    • Active Member of Clubs or Organizations: No    • Attends Club or Organization Meetings: Never    • Marital Status:    Intimate Partner Violence: Not At Risk (6/20/2022)    Received from [facility]    Intimate Partner Violence    • Within the last year, have you been afraid of your partner, ex-partner or family member?: 2    • Within the last year, have you been humiliated or emotionally abused in other ways by your partner, ex-partner or family member?: 2    • Within the last year, have you been kicked, hit, slapped, or otherwise physically hurt by your partner, ex-partner or family member?: 2    • Within the last year, have you been raped or forced to have any kind of sexual activity by your partner, ex-partner or family member?: 2   Housing Stability: Not on file        Current Outpatient Medications:   •  ammonium lactate (LAC-HYDRIN) 12 % cream, Apply topically 2 (two) times a day To feet, Disp: 385 g, Rfl: 3  •  Aspirin 81 MG CAPS, Take 81 mg by mouth (Patient not taking: Reported on 1/26/2025), Disp: , Rfl:   •  atorvastatin (LIPITOR) 20 mg tablet, Take 20 mg by mouth daily, Disp: , Rfl:   •  Coenzyme Q10-Fish Oil-Vit E (CO-Q 10 Omega-3 Fish Oil) CAPS, Take by mouth (Patient not taking: Reported on 1/26/2025), Disp: , Rfl:   •  Combivent Respimat inhaler, INHALE 1 PUFF BY MOUTH 4 TIMES A DAY AS NEEDED, Disp: , Rfl:   •  Enbrel SureClick 50 MG/ML injection, , Disp: , Rfl:   •  folic acid (FOLVITE) 800 MCG tablet, Take 400 mcg by mouth daily, Disp: , Rfl:   •   hydroxychloroquine (PLAQUENIL) 200 mg tablet, Take 400 mg by mouth daily, Disp: , Rfl:   •  leflunomide (ARAVA) 20 MG tablet, Take 1 tablet by mouth in the morning, Disp: , Rfl:   •  primidone (MYSOLINE) 50 mg tablet, Take 50 mg by mouth 4 (four) times a day, Disp: , Rfl:   •  silver sulfadiazine (SILVADENE,SSD) 1 % cream, Apply topically daily (Patient not taking: Reported on 1/26/2025), Disp: , Rfl:   •  sitaGLIPtin (Januvia) 100 mg tablet, Take 100 mg by mouth daily, Disp: , Rfl:   •  triamcinolone (KENALOG) 0.1 % ointment, Apply topically 2 (two) times a day (Patient not taking: Reported on 1/26/2025), Disp: , Rfl:   No current facility-administered medications for this visit.    Review of Systems   Constitutional:  Negative for chills and fever.   Respiratory:  Negative for shortness of breath.    Cardiovascular:  Negative for leg swelling.   Musculoskeletal:  Negative for gait problem.   Skin:  Positive for wound (Left knee).   Psychiatric/Behavioral:  The patient is not nervous/anxious.        Objective:  /76   Pulse 79   Temp 97.6 °F (36.4 °C)   Resp 18   Pain Score: 0-No pain     Physical Exam  Vitals and nursing note reviewed.   Constitutional:       General: He is not in acute distress.     Appearance: He is not ill-appearing, toxic-appearing or diaphoretic.   Eyes:      General: No scleral icterus.  Cardiovascular:      Rate and Rhythm: Normal rate.      Comments: Diminished but palpable DP/PT LLE  Pulmonary:      Effort: Pulmonary effort is normal. No respiratory distress.   Musculoskeletal:      Left lower leg: No edema.   Skin:     General: Skin is warm.      Findings: Wound present.             Comments: 1: Measurements improved.  Small unstable eschar removed during debridement revealing slough and fibrinous debris beneath.  Surgical debridement completed.  Periwound dry and flaking.  No edema erythema is present. No surrounding induration, fluctuance, crepitus.    Bilateral lower  "extremities with significant evidence of chronic venous skin changes with hyperpigmentation, dry flaking skin, hemosiderin staining   Neurological:      Mental Status: He is alert and oriented to person, place, and time.   Psychiatric:         Mood and Affect: Mood normal.         Behavior: Behavior normal.       Wound 01/29/25 Traumatic Knee Anterior;Left (Active)   Wound Image   02/12/25 1047   Wound Description Dry;Brown;Eschar 02/12/25 1048   Tammie-wound Assessment Dry;Intact;Scaly;Scar Tissue 02/12/25 1048   Wound Length (cm) 0.4 cm 02/12/25 1048   Wound Width (cm) 0.4 cm 02/12/25 1048   Wound Depth (cm) 0.1 cm 02/12/25 1048   Wound Surface Area (cm^2) 0.16 cm^2 02/12/25 1048   Wound Volume (cm^3) 0.016 cm^3 02/12/25 1048   Calculated Wound Volume (cm^3) 0.02 cm^3 02/12/25 1048   Change in Wound Size % 90.48 02/12/25 1048   Number of underminings 1 01/29/25 1504   Undermining 1 0.2 01/29/25 1504   Undermining 1 is depth extending from 12 o clock to 12 o clock 01/29/25 1504   Drainage Amount Small 02/12/25 1048   Drainage Description Yellow 02/12/25 1048   Non-staged Wound Description Full thickness 02/12/25 1048       Debridement    Universal Protocol:  Consent: Verbal consent obtained. Written consent obtained.  Risks and benefits: risks, benefits and alternatives were discussed  Consent given by: patient  Time out: Immediately prior to procedure a \"time out\" was called to verify the correct patient, procedure, equipment, support staff and site/side marked as required.  Patient understanding: patient states understanding of the procedure being performed  Patient identity confirmed: verbally with patient    Debridement Details  Performed by: physician  Debridement type: surgical  Level of debridement: subcutaneous tissue  Pain control: lidocaine 4%      Post-debridement measurements  Length (cm): 0.4  Width (cm): 0.4  Depth (cm): 0.2  Percent debrided: 100%  Surface Area (cm^2): 0.16  Area Debrided (cm^2): " 0.16  Volume (cm^3): 0.03    Tissue and other material debrided: subcutaneous tissue  Devitalized tissue debrided: exudate, slough and eschar  Instrument(s) utilized: curette  Bleeding: medium  Hemostasis obtained with: pressure  Procedural pain (0-10): 0  Post-procedural pain: 0   Response to treatment: procedure was tolerated well                   Wound Instructions:  Orders Placed This Encounter   Procedures   • Wound Procedure Treatment Traumatic Anterior;Left Knee     This order was created via procedure documentation   • Debridement     This order was created via procedure documentation   • Wound cleansing and dressings Traumatic Anterior;Left Knee     Wound cleansing and dressings Traumatic Anterior;Left Knee     Shower no      Keep dressing clean dry and intact until next visit!!      Try to avoid moving knee as much as you are able. Keep left leg elevated as much as possible.        Elastic Tubular Stocking Spandagrip E    Tubular elastic bandage: Apply from base of toes to behind the knee. Apply in AM, may remove for sleep.    Avoid prolonged standing in one place.    Elevate leg(s) above the level of the heart when sitting or as much as possible.       We will attempt to get you home health nurses       Start VNA 3x/week     Return to wound center in 1 week     Standing Status:   Future     Expiration Date:   2/19/2025       Kelli Diane MD

## 2025-02-12 ENCOUNTER — OFFICE VISIT (OUTPATIENT)
Facility: CLINIC | Age: 70
End: 2025-02-12
Payer: COMMERCIAL

## 2025-02-12 VITALS
SYSTOLIC BLOOD PRESSURE: 140 MMHG | DIASTOLIC BLOOD PRESSURE: 76 MMHG | HEART RATE: 79 BPM | RESPIRATION RATE: 18 BRPM | TEMPERATURE: 97.6 F

## 2025-02-12 DIAGNOSIS — Z72.0 TOBACCO USE: ICD-10-CM

## 2025-02-12 DIAGNOSIS — S81.002A OPEN WOUND OF LEFT KNEE, INITIAL ENCOUNTER: Primary | ICD-10-CM

## 2025-02-12 PROCEDURE — 11042 DBRDMT SUBQ TIS 1ST 20SQCM/<: CPT | Performed by: FAMILY MEDICINE

## 2025-02-12 RX ORDER — LIDOCAINE 40 MG/G
CREAM TOPICAL ONCE
Status: COMPLETED | OUTPATIENT
Start: 2025-02-12 | End: 2025-02-12

## 2025-02-12 RX ADMIN — LIDOCAINE 1 APPLICATION: 40 CREAM TOPICAL at 10:49

## 2025-02-12 NOTE — PROGRESS NOTES
Wound Procedure Treatment Traumatic Anterior;Left Knee    Performed by: Laura Verdugo RN  Authorized by: Kelli Diane MD    Associated wounds:   Wound 01/29/25 Traumatic Knee Anterior;Left  Wound cleansed with:  NSS  Applied primary dressing:  Acticoat and Silicone bordered foam  Dressing secured with:  Elastic tubular stocking, Size E, Ada and Tape  Comments:    Acticoat 7 to wound base    ACE wrap to knee

## 2025-02-12 NOTE — PATIENT INSTRUCTIONS
Orders Placed This Encounter   Procedures    Wound cleansing and dressings Traumatic Anterior;Left Knee     Wound cleansing and dressings Traumatic Anterior;Left Knee     Shower no      Keep dressing clean dry and intact until next visit!!      Try to avoid moving knee as much as you are able. Keep left leg elevated as much as possible.        Elastic Tubular Stocking Spandagrip E    Tubular elastic bandage: Apply from base of toes to behind the knee. Apply in AM, may remove for sleep.    Avoid prolonged standing in one place.    Elevate leg(s) above the level of the heart when sitting or as much as possible.       We will attempt to get you home health nurses       Start VNA 3x/week     Return to wound center in 1 week     Standing Status:   Future     Expiration Date:   2/19/2025

## 2025-02-17 NOTE — PROGRESS NOTES
"Patient ID: Florencio Pool is a 70 y.o. male Date of Birth 1955       Chief Complaint   Patient presents with   • Follow Up Wound Care Visit       Allergies:  Sulfa antibiotics    Diagnosis:      Diagnosis ICD-10-CM Associated Orders   1. Open wound of left knee, initial encounter  S81.002A Wound cleansing and dressings Traumatic Anterior;Left Knee      2. Contact dermatitis due to adhesive bandage  L23.1 Wound Procedure Treatment MASD Left Knee              Assessment & Plan:  Open traumatic wound left knee: This area has healed/completely epithelialized.  Hyperpigmented scar tissue is present.  There is no surrounding edema, induration, fluctuance, crepitus from area of former wound.  NEW weeping contact dermatitis associated with silicone bordered foam: Weeping serous drainage noted from area of dermatitis located where silicone bordered foam adhesive was placed.   Recommend applying Kenalog cream daily (patient has a new prescription of this at home)  Apply Tubifast over top  No adhesives to skin!  Reviewed importance of close monitoring of skin irritation should patient experience any acute change or any of the following symptoms including but not limited to fever, chills, increased pain at the wound, swelling, purulent drainage, foul odor, etc... to immediately proceed to emergency department. Pt expresses understanding.   See below wound care orders for additional details   Follow-up in 1 week or sooner if needed     Portions of the record may have been created with voice recognition software. Occasional wrong word or \"sound alike\" substitutions may have occurred due to the inherent limitations of voice recognition software. Read the chart carefully and recognize, using context, where substitutions have occurred.    Subjective:   1/29/2025: Mr. Pool is a 70-year-old male with a past medical history including but not limited to stasis dermatitis, rheumatoid arthritis, tobacco use, and diabetes who " presents to the wound center today for evaluation of left knee wound that occurred around 1/23/2025 when he bumped his leg on a part in his car.  Denies any foreign body within wound.  Per patient and chart review went to urgent care on 1/26/2025 because he was concerned for infection.  At urgent care, x-ray completed which did not show any acute abnormalities.  He was prescribed Keflex and instructed to utilize nonadherent gauze ABD and Coban and.  Referred to wound care by urgent care.  Patient has followed with a wound care center in the past for stasis dermatitis and venous ulcerations.   He reports he has not had fever or chills.  See below for full ROS    2/5/2025: Mr. Pool presents today for follow-up.  He reports that he has been doing well since he was last seen but did not get home nurses.  He reports that he does not have pain in his knee and overall feels that it feels better when compared to last week's visit.  He denies fever, chills.  Continues to smoke.  Denies other new acute complaints.    2/12/2025: Florencio presents today for follow-up of knee wound.  He reports since he was last seen he has no new acute complaints.  He denies fever, chills.  Continues to smoke, is not interested in quitting.    2/19/2025: Florencio presents today for follow-up.  Since his last visit dressing has remained in place.  He has not had fever or chills.  No other new acute complaints today.  Continues to smoke.      The following portions of the patient's history were reviewed and updated as appropriate:   Patient Active Problem List   Diagnosis   • Type 2 diabetes mellitus with other specified complication (HCC)     Past Medical History:   Diagnosis Date   • Diabetes mellitus (HCC)    • Rheumatic arteritis      Past Surgical History:   Procedure Laterality Date   • NO PAST SURGERIES       No family history on file.   Social History     Socioeconomic History   • Marital status: Single     Spouse name: None   • Number of  children: None   • Years of education: None   • Highest education level: None   Occupational History   • None   Tobacco Use   • Smoking status: Some Days     Current packs/day: 0.50     Types: Cigarettes   • Smokeless tobacco: Never   Vaping Use   • Vaping status: Never Used   Substance and Sexual Activity   • Alcohol use: Not Currently   • Drug use: Not Currently   • Sexual activity: None   Other Topics Concern   • None   Social History Narrative   • None     Social Drivers of Health     Financial Resource Strain: Medium Risk (6/20/2022)    Received from [facility]    Overall Financial Resource Strain (CARDIA)    • Difficulty of Paying Living Expenses: Somewhat hard   Food Insecurity: No Food Insecurity (6/20/2022)    Received from [facility]    Hunger Vital Sign    • Worried About Running Out of Food in the Last Year: Never true    • Ran Out of Food in the Last Year: Never true   Transportation Needs: No Transportation Needs (6/20/2022)    Received from [facility]    PRAPARE - Transportation    • Lack of Transportation (Medical): No    • Lack of Transportation (Non-Medical): No   Physical Activity: Insufficiently Active (6/20/2022)    Received from [facility]    Exercise Vital Sign    • Days of Exercise per Week: 3 days    • Minutes of Exercise per Session: 30 min   Stress: Stress Concern Present (6/20/2022)    Received from [facility]    [employer] - Occupational Stress Questionnaire    • Feeling of Stress : Very much   Social Connections: Moderately Isolated (6/20/2022)    Received from [facility]    Social Connection and Isolation Panel [NHANES]    • Frequency of Communication with Friends and Family: More than three times a week    • Frequency of Social Gatherings with Friends and Family: Once a week    • Attends Mosque Services: 1 to 4 times per year    • Active Member of Clubs or  Organizations: No    • Attends Club or Organization Meetings: Never    • Marital Status:    Intimate Partner Violence: Not At Risk (6/20/2022)    Received from [facility]    Intimate Partner Violence    • Within the last year, have you been afraid of your partner, ex-partner or family member?: 2    • Within the last year, have you been humiliated or emotionally abused in other ways by your partner, ex-partner or family member?: 2    • Within the last year, have you been kicked, hit, slapped, or otherwise physically hurt by your partner, ex-partner or family member?: 2    • Within the last year, have you been raped or forced to have any kind of sexual activity by your partner, ex-partner or family member?: 2   Housing Stability: Not on file        Current Outpatient Medications:   •  ammonium lactate (LAC-HYDRIN) 12 % cream, Apply topically 2 (two) times a day To feet, Disp: 385 g, Rfl: 3  •  Aspirin 81 MG CAPS, Take 81 mg by mouth (Patient not taking: Reported on 1/26/2025), Disp: , Rfl:   •  atorvastatin (LIPITOR) 20 mg tablet, Take 20 mg by mouth daily, Disp: , Rfl:   •  Coenzyme Q10-Fish Oil-Vit E (CO-Q 10 Omega-3 Fish Oil) CAPS, Take by mouth (Patient not taking: Reported on 1/26/2025), Disp: , Rfl:   •  Combivent Respimat inhaler, INHALE 1 PUFF BY MOUTH 4 TIMES A DAY AS NEEDED, Disp: , Rfl:   •  Enbrel SureClick 50 MG/ML injection, , Disp: , Rfl:   •  folic acid (FOLVITE) 800 MCG tablet, Take 400 mcg by mouth daily, Disp: , Rfl:   •  hydroxychloroquine (PLAQUENIL) 200 mg tablet, Take 400 mg by mouth daily, Disp: , Rfl:   •  leflunomide (ARAVA) 20 MG tablet, Take 1 tablet by mouth in the morning, Disp: , Rfl:   •  primidone (MYSOLINE) 50 mg tablet, Take 50 mg by mouth 4 (four) times a day, Disp: , Rfl:   •  silver sulfadiazine (SILVADENE,SSD) 1 % cream, Apply topically daily (Patient not taking: Reported on 1/26/2025), Disp: , Rfl:   •  sitaGLIPtin (Januvia) 100 mg tablet, Take 100 mg by  mouth daily, Disp: , Rfl:   •  triamcinolone (KENALOG) 0.1 % ointment, Apply topically 2 (two) times a day (Patient not taking: Reported on 1/26/2025), Disp: , Rfl:     Review of Systems   Constitutional:  Negative for chills and fever.   Respiratory:  Negative for shortness of breath.    Cardiovascular:  Negative for leg swelling.   Musculoskeletal:  Negative for gait problem.   Skin:  Positive for wound (Left knee).   Psychiatric/Behavioral:  The patient is not nervous/anxious.        Objective:  /65   Pulse 74   Temp 97.6 °F (36.4 °C)   Resp 18         Physical Exam  Vitals and nursing note reviewed.   Constitutional:       General: He is not in acute distress.     Appearance: He is not ill-appearing, toxic-appearing or diaphoretic.   Eyes:      General: No scleral icterus.  Cardiovascular:      Rate and Rhythm: Normal rate.      Comments: Diminished but palpable DP/PT LLE  Pulmonary:      Effort: Pulmonary effort is normal. No respiratory distress.   Musculoskeletal:      Left lower leg: No edema.   Skin:     General: Skin is warm.      Findings: Rash (contact dermatitis) present.             Comments: 1: This area has healed/completely epithelialized.  Hyperpigmented scar tissue is present.  There is no surrounding edema, induration, fluctuance, crepitus from area of former wound.    2: NEW weeping contact dermatitis associated with silicone bordered foam: Weeping serous drainage noted from area of dermatitis located where silicone bordered foam adhesive was placed.     Bilateral lower extremities with significant evidence of chronic venous skin changes with hyperpigmentation, dry flaking skin, hemosiderin staining   Neurological:      Mental Status: He is alert and oriented to person, place, and time.   Psychiatric:         Mood and Affect: Mood normal.         Behavior: Behavior normal.       Wound 01/29/25 Traumatic Knee Anterior;Left (Active)   Wound Image   02/19/25 3517   Wound Description  "Dry;Brown;Eschar;Intact 02/19/25 1419   Tammie-wound Assessment Rash;Erythema 02/19/25 1419   Wound Length (cm) 0 cm 02/19/25 1419   Wound Width (cm) 0 cm 02/19/25 1419   Wound Depth (cm) 0 cm 02/19/25 1419   Wound Surface Area (cm^2) 0 cm^2 02/19/25 1419   Wound Volume (cm^3) 0 cm^3 02/19/25 1419   Calculated Wound Volume (cm^3) 0 cm^3 02/19/25 1419   Change in Wound Size % 100 02/19/25 1419   Number of underminings 1 01/29/25 1504   Undermining 1 0.2 01/29/25 1504   Undermining 1 is depth extending from 12 o clock to 12 o clock 01/29/25 1504   Drainage Amount Small 02/12/25 1048   Drainage Description Yellow 02/12/25 1048   Non-staged Wound Description Not applicable 02/19/25 1419   Treatments Cleansed 02/19/25 1419       Wound 02/19/25 MASD Knee Left (Active)   Wound Description         Beefy red;Epithelialization 02/19/25 1444   Tammie-wound Assessment Erythema;Rash 02/19/25 1444   Wound Length (cm) 4 cm 02/19/25 1444   Wound Width (cm) 3 cm 02/19/25 1444   Wound Depth (cm) 0.1 cm 02/19/25 1444   Wound Surface Area (cm^2) 12 cm^2 02/19/25 1444   Wound Volume (cm^3) 1.2 cm^3 02/19/25 1444   Calculated Wound Volume (cm^3) 1.2 cm^3 02/19/25 1444   Drainage Amount Moderate 02/19/25 1444   Drainage Description Serous 02/19/25 1444   Non-staged Wound Description Full thickness 02/19/25 1444           No results found for: \"HGBA1C\"    Wound Instructions:  Orders Placed This Encounter   Procedures   • Wound cleansing and dressings Traumatic Anterior;Left Knee     Wound location Left knee  Change dressing daily  You may remove the dressing and shower. Do not leave wound open to air, apply new dressing immediately.  Cleanse the wound with wound cleanser or mild soap and water, rinse, pat dry.  Apply triamcinolone cream to the periwound.   Cover with dry gauze  Secure with rolled gauze and tape or tubifast blue       Elastic Tubular Stocking Spandagrip E  Tubular elastic bandage: Apply from base of toes to behind the knee. " Apply in AM, may remove for sleep.   Avoid prolonged standing in one place.   Elevate leg(s) above the level of the heart when sitting or as much as possible.       Return to wound center in 1 week     Standing Status:   Future     Expected Date:   2/19/2025     Expiration Date:   2/26/2025   • Wound Procedure Treatment MASD Left Knee     This order was created via procedure documentation     Kelli Diane MD

## 2025-02-19 ENCOUNTER — OFFICE VISIT (OUTPATIENT)
Facility: CLINIC | Age: 70
End: 2025-02-19
Payer: COMMERCIAL

## 2025-02-19 VITALS
RESPIRATION RATE: 18 BRPM | DIASTOLIC BLOOD PRESSURE: 65 MMHG | TEMPERATURE: 97.6 F | HEART RATE: 74 BPM | SYSTOLIC BLOOD PRESSURE: 131 MMHG

## 2025-02-19 DIAGNOSIS — L23.1 CONTACT DERMATITIS DUE TO ADHESIVE BANDAGE: ICD-10-CM

## 2025-02-19 DIAGNOSIS — S81.002A OPEN WOUND OF LEFT KNEE, INITIAL ENCOUNTER: Primary | ICD-10-CM

## 2025-02-19 PROCEDURE — 99213 OFFICE O/P EST LOW 20 MIN: CPT | Performed by: FAMILY MEDICINE

## 2025-02-19 PROCEDURE — G0463 HOSPITAL OUTPT CLINIC VISIT: HCPCS | Performed by: FAMILY MEDICINE

## 2025-02-19 NOTE — PATIENT INSTRUCTIONS
Orders Placed This Encounter   Procedures    Wound cleansing and dressings Traumatic Anterior;Left Knee     Wound location Left knee  Change dressing daily  You may remove the dressing and shower. Do not leave wound open to air, apply new dressing immediately.  Cleanse the wound with wound cleanser or mild soap and water, rinse, pat dry.  Apply triamcinolone cream to the periwound.   Cover with dry gauze  Secure with rolled gauze and tape or tubifast blue       Elastic Tubular Stocking Spandagrip E  Tubular elastic bandage: Apply from base of toes to behind the knee. Apply in AM, may remove for sleep.   Avoid prolonged standing in one place.   Elevate leg(s) above the level of the heart when sitting or as much as possible.       Return to wound center in 1 week     Standing Status:   Future     Expected Date:   2/19/2025     Expiration Date:   2/26/2025

## 2025-02-19 NOTE — PROGRESS NOTES
Wound Procedure Treatment MASD Left Knee    Performed by: Mariela Bernard RN  Authorized by: Kelli Diane MD  Associated wounds:   Wound 02/19/25 MASD Knee Left    Wound cleansed with:  Soap and water   Applied to periwound:  Steroid cream / ointment   Applied secondary dressing:  Gauze   Dressing secured with:  Tubifast

## 2025-02-26 ENCOUNTER — OFFICE VISIT (OUTPATIENT)
Facility: CLINIC | Age: 70
End: 2025-02-26
Payer: COMMERCIAL

## 2025-02-26 VITALS
RESPIRATION RATE: 18 BRPM | HEART RATE: 68 BPM | DIASTOLIC BLOOD PRESSURE: 70 MMHG | TEMPERATURE: 96.9 F | SYSTOLIC BLOOD PRESSURE: 143 MMHG

## 2025-02-26 DIAGNOSIS — L23.1 CONTACT DERMATITIS DUE TO ADHESIVE BANDAGE: ICD-10-CM

## 2025-02-26 DIAGNOSIS — S81.002A OPEN WOUND OF LEFT KNEE, INITIAL ENCOUNTER: Primary | ICD-10-CM

## 2025-02-26 PROCEDURE — G0463 HOSPITAL OUTPT CLINIC VISIT: HCPCS | Performed by: FAMILY MEDICINE

## 2025-02-26 PROCEDURE — 99212 OFFICE O/P EST SF 10 MIN: CPT | Performed by: FAMILY MEDICINE

## 2025-02-26 NOTE — PROGRESS NOTES
Wound Procedure Treatment MASD Left Knee    Performed by: Laura Verdugo RN  Authorized by: Kelli Diane MD  Associated wounds:   Wound 02/19/25 MASD Knee Left    Applied to periwound:  Moisture lotion   Dressing secured with:  Elastic tubular stocking and Size E

## 2025-02-26 NOTE — PROGRESS NOTES
"Patient ID: Florencio Pool is a 70 y.o. male Date of Birth 1955       Chief Complaint   Patient presents with   • Follow Up Wound Care Visit       Allergies:  Sulfa antibiotics    Diagnosis:      Diagnosis ICD-10-CM Associated Orders   1. Open wound of left knee, initial encounter  S81.002A Wound cleansing and dressings     Wound Procedure Treatment MASD Left Knee      2. Contact dermatitis due to adhesive bandage  L23.1               Assessment & Plan:  Wound of L knee 2/2 to weeping contact dermatitis: Resolved.  Skin is completely healed/epithelialized.  Overall skin is dry, no open areas or wounds noted.  Recommend regular moisturizing of skin of lower extremity with gentle moisturizing lotion  Continue to recommend smoking cessation  Follow-up in wound center as needed    Portions of the record may have been created with voice recognition software. Occasional wrong word or \"sound alike\" substitutions may have occurred due to the inherent limitations of voice recognition software. Read the chart carefully and recognize, using context, where substitutions have occurred.    Subjective:   1/29/2025: Mr. Pool is a 70-year-old male with a past medical history including but not limited to stasis dermatitis, rheumatoid arthritis, tobacco use, and diabetes who presents to the wound center today for evaluation of left knee wound that occurred around 1/23/2025 when he bumped his leg on a part in his car.  Denies any foreign body within wound.  Per patient and chart review went to urgent care on 1/26/2025 because he was concerned for infection.  At urgent care, x-ray completed which did not show any acute abnormalities.  He was prescribed Keflex and instructed to utilize nonadherent gauze ABD and Coban and.  Referred to wound care by urgent care.  Patient has followed with a wound care center in the past for stasis dermatitis and venous ulcerations.   He reports he has not had fever or chills.  See below for full " ROS    2/5/2025: Mr. Pool presents today for follow-up.  He reports that he has been doing well since he was last seen but did not get home nurses.  He reports that he does not have pain in his knee and overall feels that it feels better when compared to last week's visit.  He denies fever, chills.  Continues to smoke.  Denies other new acute complaints.    2/12/2025: Florencio presents today for follow-up of knee wound.  He reports since he was last seen he has no new acute complaints.  He denies fever, chills.  Continues to smoke, is not interested in quitting.    2/19/2025: Florencio presents today for follow-up.  Since his last visit dressing has remained in place.  He has not had fever or chills.  No other new acute complaints today.  Continues to smoke.    2/26/2025: Florencio presents today for follow-up.  He reports that he feels his wound/the weeping dermatitis has resolved.  No other new acute complaints today.  Continues to smoke.  Denies fever, chills.        The following portions of the patient's history were reviewed and updated as appropriate:   Patient Active Problem List   Diagnosis   • Type 2 diabetes mellitus with other specified complication (HCC)     Past Medical History:   Diagnosis Date   • Diabetes mellitus (HCC)    • Rheumatic arteritis      Past Surgical History:   Procedure Laterality Date   • NO PAST SURGERIES       No family history on file.   Social History     Socioeconomic History   • Marital status: Single     Spouse name: Not on file   • Number of children: Not on file   • Years of education: Not on file   • Highest education level: Not on file   Occupational History   • Not on file   Tobacco Use   • Smoking status: Some Days     Current packs/day: 0.50     Types: Cigarettes   • Smokeless tobacco: Never   Vaping Use   • Vaping status: Never Used   Substance and Sexual Activity   • Alcohol use: Not Currently   • Drug use: Not Currently   • Sexual activity: Not on file   Other Topics Concern    • Not on file   Social History Narrative   • Not on file     Social Drivers of Health     Financial Resource Strain: Medium Risk (6/20/2022)    Received from [facility]    Overall Financial Resource Strain (CARDIA)    • Difficulty of Paying Living Expenses: Somewhat hard   Food Insecurity: No Food Insecurity (6/20/2022)    Received from [facility]    Hunger Vital Sign    • Worried About Running Out of Food in the Last Year: Never true    • Ran Out of Food in the Last Year: Never true   Transportation Needs: No Transportation Needs (6/20/2022)    Received from [facility]    PRAPARE - Transportation    • Lack of Transportation (Medical): No    • Lack of Transportation (Non-Medical): No   Physical Activity: Insufficiently Active (6/20/2022)    Received from [facility]    Exercise Vital Sign    • Days of Exercise per Week: 3 days    • Minutes of Exercise per Session: 30 min   Stress: Stress Concern Present (6/20/2022)    Received from [facility]    [place] [employer] - Occupational Stress Questionnaire    • Feeling of Stress : Very much   Social Connections: Moderately Isolated (6/20/2022)    Received from [facility]    Social Connection and Isolation Panel [NHANES]    • Frequency of Communication with Friends and Family: More than three times a week    • Frequency of Social Gatherings with Friends and Family: Once a week    • Attends Jew Services: 1 to 4 times per year    • Active Member of Clubs or Organizations: No    • Attends Club or Organization Meetings: Never    • Marital Status:    Intimate Partner Violence: Not At Risk (6/20/2022)    Received from [facility]    Intimate Partner Violence    • Within the last year, have you been afraid of your partner, ex-partner or family member?: 2    • Within the last year, have you been humiliated or emotionally abused in other ways by your  partner, ex-partner or family member?: 2    • Within the last year, have you been kicked, hit, slapped, or otherwise physically hurt by your partner, ex-partner or family member?: 2    • Within the last year, have you been raped or forced to have any kind of sexual activity by your partner, ex-partner or family member?: 2   Housing Stability: Not on file        Current Outpatient Medications:   •  ammonium lactate (LAC-HYDRIN) 12 % cream, Apply topically 2 (two) times a day To feet, Disp: 385 g, Rfl: 3  •  Aspirin 81 MG CAPS, Take 81 mg by mouth (Patient not taking: Reported on 1/26/2025), Disp: , Rfl:   •  atorvastatin (LIPITOR) 20 mg tablet, Take 20 mg by mouth daily, Disp: , Rfl:   •  Coenzyme Q10-Fish Oil-Vit E (CO-Q 10 Omega-3 Fish Oil) CAPS, Take by mouth (Patient not taking: Reported on 1/26/2025), Disp: , Rfl:   •  Combivent Respimat inhaler, INHALE 1 PUFF BY MOUTH 4 TIMES A DAY AS NEEDED, Disp: , Rfl:   •  Enbrel SureClick 50 MG/ML injection, , Disp: , Rfl:   •  folic acid (FOLVITE) 800 MCG tablet, Take 400 mcg by mouth daily, Disp: , Rfl:   •  hydroxychloroquine (PLAQUENIL) 200 mg tablet, Take 400 mg by mouth daily, Disp: , Rfl:   •  leflunomide (ARAVA) 20 MG tablet, Take 1 tablet by mouth in the morning, Disp: , Rfl:   •  primidone (MYSOLINE) 50 mg tablet, Take 50 mg by mouth 4 (four) times a day, Disp: , Rfl:   •  silver sulfadiazine (SILVADENE,SSD) 1 % cream, Apply topically daily (Patient not taking: Reported on 1/26/2025), Disp: , Rfl:   •  sitaGLIPtin (Januvia) 100 mg tablet, Take 100 mg by mouth daily, Disp: , Rfl:   •  triamcinolone (KENALOG) 0.1 % ointment, Apply topically 2 (two) times a day (Patient not taking: Reported on 1/26/2025), Disp: , Rfl:     Review of Systems   Constitutional:  Negative for chills and fever.   Respiratory:  Negative for shortness of breath.    Cardiovascular:  Negative for leg swelling.   Musculoskeletal:  Negative for gait problem.   Skin:  Positive for wound (Left  "knee - thinks it is healed).   Psychiatric/Behavioral:  The patient is not nervous/anxious.        Objective:  /70   Pulse 68   Temp (!) 96.9 °F (36.1 °C)   Resp 18   Pain Score: 0-No pain     Physical Exam  Vitals and nursing note reviewed.   Constitutional:       General: He is not in acute distress.     Appearance: He is not ill-appearing, toxic-appearing or diaphoretic.   Eyes:      General: No scleral icterus.  Cardiovascular:      Rate and Rhythm: Normal rate.      Comments: Diminished but palpable DP/PT LLE  Pulmonary:      Effort: Pulmonary effort is normal. No respiratory distress.   Musculoskeletal:      Left lower leg: No edema.   Skin:     General: Skin is warm.      Findings: No rash (Resolved).             Comments: 1- Resolved.  Skin is completely healed/epithelialized.  Overall skin is dry, no open areas or wounds noted.    Bilateral lower extremities with significant evidence of chronic venous skin changes with hyperpigmentation, dry flaking skin, hemosiderin staining   Neurological:      Mental Status: He is alert and oriented to person, place, and time.   Psychiatric:         Mood and Affect: Mood normal.         Behavior: Behavior normal.           Wound 02/19/25 MASD Knee Left (Active)   Wound Image   02/26/25 1130   Wound Description Dry;Intact 02/26/25 1128   Tammie-wound Assessment Dry;Intact;Scaly 02/26/25 1128   Wound Length (cm) 0 cm 02/26/25 1128   Wound Width (cm) 0 cm 02/26/25 1128   Wound Depth (cm) 0 cm 02/26/25 1128   Wound Surface Area (cm^2) 0 cm^2 02/26/25 1128   Wound Volume (cm^3) 0 cm^3 02/26/25 1128   Calculated Wound Volume (cm^3) 0 cm^3 02/26/25 1128   Change in Wound Size % 100 02/26/25 1128   Drainage Amount None 02/26/25 1128   Drainage Description Serous 02/19/25 1444   Non-staged Wound Description Full thickness 02/19/25 1444           No results found for: \"HGBA1C\"    Wound Instructions:  Orders Placed This Encounter   Procedures   • Wound cleansing and " dressings     Left knee wound healed today!    Apply moisturizer daily     Continue to wear compression stockings to bilateral lower extremity  Spandagrip E placed today. Ensure it measures from base of toes to base of knee.        Follow up with wound center as needed     Standing Status:   Future     Expiration Date:   3/5/2025   • Wound Procedure Treatment MASD Left Knee     This order was created via procedure documentation         Kelli Diane MD

## 2025-04-10 ENCOUNTER — TELEPHONE (OUTPATIENT)
Age: 70
End: 2025-04-10

## 2025-04-10 ENCOUNTER — TELEPHONE (OUTPATIENT)
Dept: OBGYN CLINIC | Facility: CLINIC | Age: 70
End: 2025-04-10

## 2025-04-10 NOTE — TELEPHONE ENCOUNTER
LVM to let patient know we are Non-par with his HMO insurance at [facility] Per our manager, Marley she said we still can see you at 4/23/25 visit  since it is a follow up but he will need to find another podiatrist provider that does participate with insurance.Asked patient that he call the office back to confirm he received Message.

## 2025-04-10 NOTE — TELEPHONE ENCOUNTER
Caller: Florencio Pool    Doctor: Dr. Saenz    Reason for call:  Florencio received a text message about his insurance.  He thinks it was from the office.  Can someone please reach out to him?  Thank you    Call back#: [phone]

## 2025-04-11 NOTE — TELEPHONE ENCOUNTER
Recalled patient relayed previous telephone message from 4/10/25 He said he wants to cancel 4/23/25 he doesn't want to pay a higher out of pocket expense. Appointment canceled

## 2025-07-03 DIAGNOSIS — L85.3 XEROSIS CUTIS: ICD-10-CM

## 2025-07-03 RX ORDER — AMMONIUM LACTATE 12 G/100G
CREAM TOPICAL 2 TIMES DAILY
Qty: 385 G | Refills: 2 | Status: SHIPPED | OUTPATIENT
Start: 2025-07-03